# Patient Record
Sex: MALE | Race: WHITE | Employment: FULL TIME | URBAN - METROPOLITAN AREA
[De-identification: names, ages, dates, MRNs, and addresses within clinical notes are randomized per-mention and may not be internally consistent; named-entity substitution may affect disease eponyms.]

---

## 2022-07-27 ENCOUNTER — OFFICE VISIT (OUTPATIENT)
Dept: INTERNAL MEDICINE CLINIC | Facility: CLINIC | Age: 52
End: 2022-07-27
Payer: COMMERCIAL

## 2022-07-27 VITALS
BODY MASS INDEX: 29.52 KG/M2 | DIASTOLIC BLOOD PRESSURE: 64 MMHG | WEIGHT: 206.19 LBS | TEMPERATURE: 98 F | SYSTOLIC BLOOD PRESSURE: 102 MMHG | OXYGEN SATURATION: 98 % | HEART RATE: 75 BPM | HEIGHT: 70 IN

## 2022-07-27 DIAGNOSIS — H91.90 HEARING LOSS, UNSPECIFIED HEARING LOSS TYPE, UNSPECIFIED LATERALITY: ICD-10-CM

## 2022-07-27 DIAGNOSIS — F17.210 CIGARETTE SMOKER: ICD-10-CM

## 2022-07-27 DIAGNOSIS — E66.3 OVERWEIGHT (BMI 25.0-29.9): ICD-10-CM

## 2022-07-27 DIAGNOSIS — Z00.00 HEALTH MAINTENANCE EXAMINATION: Primary | ICD-10-CM

## 2022-07-27 PROCEDURE — 90472 IMMUNIZATION ADMIN EACH ADD: CPT | Performed by: FAMILY MEDICINE

## 2022-07-27 PROCEDURE — 3008F BODY MASS INDEX DOCD: CPT | Performed by: FAMILY MEDICINE

## 2022-07-27 PROCEDURE — 90715 TDAP VACCINE 7 YRS/> IM: CPT | Performed by: FAMILY MEDICINE

## 2022-07-27 PROCEDURE — 90471 IMMUNIZATION ADMIN: CPT | Performed by: FAMILY MEDICINE

## 2022-07-27 PROCEDURE — 3074F SYST BP LT 130 MM HG: CPT | Performed by: FAMILY MEDICINE

## 2022-07-27 PROCEDURE — 96127 BRIEF EMOTIONAL/BEHAV ASSMT: CPT | Performed by: FAMILY MEDICINE

## 2022-07-27 PROCEDURE — 99406 BEHAV CHNG SMOKING 3-10 MIN: CPT | Performed by: FAMILY MEDICINE

## 2022-07-27 PROCEDURE — 90677 PCV20 VACCINE IM: CPT | Performed by: FAMILY MEDICINE

## 2022-07-27 PROCEDURE — 3078F DIAST BP <80 MM HG: CPT | Performed by: FAMILY MEDICINE

## 2022-07-27 PROCEDURE — 99386 PREV VISIT NEW AGE 40-64: CPT | Performed by: FAMILY MEDICINE

## 2022-07-27 NOTE — ASSESSMENT & PLAN NOTE
Patient reports that wife has been noting potential hearing loss  Referral to ENT to further evaluate

## 2022-07-28 DIAGNOSIS — E78.2 MIXED HYPERLIPIDEMIA: Primary | ICD-10-CM

## 2022-07-28 PROBLEM — E78.5 HYPERLIPIDEMIA: Status: ACTIVE | Noted: 2022-07-28

## 2022-07-28 LAB
ABSOLUTE BASOPHILS: 28 CELLS/UL (ref 0–200)
ABSOLUTE EOSINOPHILS: 78 CELLS/UL (ref 15–500)
ABSOLUTE LYMPHOCYTES: 2989 CELLS/UL (ref 850–3900)
ABSOLUTE MONOCYTES: 270 CELLS/UL (ref 200–950)
ABSOLUTE NEUTROPHILS: 3735 CELLS/UL (ref 1500–7800)
ALBUMIN/GLOBULIN RATIO: 2 (CALC) (ref 1–2.5)
ALBUMIN: 4.7 G/DL (ref 3.6–5.1)
ALKALINE PHOSPHATASE: 71 U/L (ref 35–144)
ALT: 20 U/L (ref 9–46)
AST: 17 U/L (ref 10–35)
BASOPHILS: 0.4 %
BILIRUBIN, TOTAL: 0.4 MG/DL (ref 0.2–1.2)
BUN: 13 MG/DL (ref 7–25)
CALCIUM: 10 MG/DL (ref 8.6–10.3)
CARBON DIOXIDE: 31 MMOL/L (ref 20–32)
CHLORIDE: 105 MMOL/L (ref 98–110)
CHOL/HDLC RATIO: 5.7 (CALC)
CHOLESTEROL, TOTAL: 217 MG/DL
CREATININE: 0.85 MG/DL (ref 0.7–1.3)
EGFR: 106 ML/MIN/1.73M2
EOSINOPHILS: 1.1 %
GLOBULIN: 2.4 G/DL (CALC) (ref 1.9–3.7)
GLUCOSE: 70 MG/DL (ref 65–99)
HDL CHOLESTEROL: 38 MG/DL
HEMATOCRIT: 46.3 % (ref 38.5–50)
HEMOGLOBIN A1C: 5 % OF TOTAL HGB
HEMOGLOBIN: 15.3 G/DL (ref 13.2–17.1)
LDL-CHOLESTEROL: 139 MG/DL (CALC)
LYMPHOCYTES: 42.1 %
MCH: 31.6 PG (ref 27–33)
MCHC: 33 G/DL (ref 32–36)
MCV: 95.7 FL (ref 80–100)
MONOCYTES: 3.8 %
MPV: 13.5 FL (ref 7.5–12.5)
NEUTROPHILS: 52.6 %
NON-HDL CHOLESTEROL: 179 MG/DL (CALC)
PLATELET COUNT: 142 THOUSAND/UL (ref 140–400)
POTASSIUM: 4.7 MMOL/L (ref 3.5–5.3)
PROTEIN, TOTAL: 7.1 G/DL (ref 6.1–8.1)
PSA, TOTAL: 0.99 NG/ML
RDW: 12 % (ref 11–15)
RED BLOOD CELL COUNT: 4.84 MILLION/UL (ref 4.2–5.8)
SIGNAL TO CUT-OFF: 0.03
SODIUM: 142 MMOL/L (ref 135–146)
TRIGLYCERIDES: 261 MG/DL
TSH W/REFLEX TO FT4: 2.95 MIU/L (ref 0.4–4.5)
WHITE BLOOD CELL COUNT: 7.1 THOUSAND/UL (ref 3.8–10.8)

## 2022-07-28 NOTE — PROGRESS NOTES
CT calcium score ordered to evaluate cholesterol values and elevated ASCVD risk score. If he prefers to go with statin, he states he will call back. Can consider atorvastatin 20 mg at that time.

## 2024-02-21 NOTE — PROGRESS NOTES
FAMILY MEDICINE CLINIC NOTE    HPI  Imtiaz Isaac is a 53 year old male presenting for physical    #Health Maintenance  -Diet: Good overall. Eats vegetables.    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  -Lung cancer screen: - Smoking history but 19 pack year  -Colon cancer screen: Indicated - provided colonoscopy  -Prostate cancer screen: Indicated- will plan to do next year, recently had blood drawn  -Aortic aneurysm screen: Not indicated  -Statin:  Will check lipid panel  -ASA: Not indicated  -HIV screen: 7/2022 negative  -Hep C screen: 7/2022 negative  -Gonorrhea/chlamydia:  Not indicated  -Syphillis: Not indicated  -TB: Not indicated  -Tobacco/alcohol: Per below  -Depression: PHQ-2 score of 0 (score >/= 3 do PHQ-9)  -Advanced Directive: Indicated     #Immunizations  -Tdap: 7/2022  -Flu shot: Indicated - declines  -PCV13: Not indicated   -PCV20: 7/2022  -PPSV23: Not indicated   -HPV: Not indicated  -RZV (preferred) or VZL: Indicated  -RSV: Not indicated  -COVID: Pfizer     #HLD  -CT calcium score ordered previously, never completed  -reports recently had blood work checked through insurance a few weeks ago - improved from prior    #Thrombocytopenia  -CBC 1/26 - platelet 129     #Hearing loss  -wife reporting hearing loss  -ENT     #Smoking  -still smoking 0.5 ppd   -desiring to quit at this time  -prefers trying varenicline    #Patient Care Team  Patient Care Team:  Tony Clarke MD as PCP - General (Family Medicine)    ROS  GENERAL: No fever/chills, no recent weight loss   HEENT: No visual changes, no changes in hearing, no sore throats  NECK: No pain, no swelling  RESP: No cough, no SOB  CV: No chest pain, no palpitations  GI: No abd pain, no N/V/D  MSK: No edema, no pain  SKIN: No new rashes  NEURO: No numbness, no tingling, no HA    HEALTH MAINTENANCE CHECKLIST  Health Maintenance Topics with due status: Overdue       Topic Date Due    Colorectal Cancer Screening Never done     Tobacco Cessation Counseling 1 Year Never done    Zoster Vaccines Never done    Annual Physical 07/27/2023    COVID-19 Vaccine 09/01/2023    Influenza Vaccine 10/01/2023    Annual Depression Screening 01/01/2024       ALLERGIES  No Known Allergies    MEDICATIONS  Current Outpatient Medications   Medication Sig Dispense Refill    Varenicline Tartrate, Starter, 0.5 MG X 11 & 1 MG X 42 Oral Tablet Therapy Pack Take 1 tablet by mouth in the morning and 1 tablet before bedtime. Days 1 to 3: 0.5 mg once daily. Days 4 to 7: 0.5 mg twice daily. Maintenance (day 8 and later): 1 mg twice daily. 53 each 0    varenicline 1 MG Oral Tab Take 1 tablet (1 mg total) by mouth 2 (two) times daily. 180 tablet 1       ACTIVE PROBLEM  Patient Active Problem List   Diagnosis    Cigarette smoker    Health maintenance examination    Overweight (BMI 25.0-29.9)    Hearing loss    Hyperlipidemia    Thrombocytopenia (HCC)       PAST MEDICAL HISTORY  Past Medical History:   Diagnosis Date    Hyperlipidemia 07/28/2022       PAST SOCIAL HISTORY  Social History     Socioeconomic History    Marital status:      Spouse name: Not on file    Number of children: Not on file    Years of education: Not on file    Highest education level: Not on file   Occupational History    Not on file   Tobacco Use    Smoking status: Every Day     Packs/day: 0.50     Years: 38.00     Additional pack years: 0.00     Total pack years: 19.00     Types: Cigarettes     Start date: 7/27/1986    Smokeless tobacco: Never   Vaping Use    Vaping Use: Never used   Substance and Sexual Activity    Alcohol use: Yes     Comment: Glass of wine every other day    Drug use: Never    Sexual activity: Yes     Partners: Female   Other Topics Concern    Not on file   Social History Narrative    Relationships:  - Masha ( )    Children: 3 kids - Tricia (F) - [place] studying creative writing (graduating), Babar(FELIX) - planning to go to [employer] for [facility] [employer] [facility], Jean Marie  (M) - starting high school    Pets: Dog    School: N/A    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     Origin: Born in [place], grew up in [place], came back to [place].     Interests:  Enjoys spending time with family (kids music, theater, sports), tech related - minecraft    Spiritual: Practicing [employer] in [place]     Social Determinants of Health     Financial Resource Strain: Not on file   Food Insecurity: Not on file   Transportation Needs: Not on file   Physical Activity: Not on file   Stress: Not on file   Social Connections: Not on file   Housing Stability: Not on file       PAST SURGICAL HISTORY  Past Surgical History:   Procedure Laterality Date    APPENDECTOMY      HERNIA REPAIR         PAST FAMILY HISTORY  Family History   Problem Relation Age of Onset    Dementia Mother     No Known Problems Father     No Known Problems Sister     No Known Problems Brother     No Known Problems Maternal Grandmother     No Known Problems Maternal Grandfather     Diabetes Paternal Grandmother     No Known Problems Paternal Grandfather     No Known Problems Brother     No Known Problems Brother     No Known Problems Brother     Colon Cancer Neg     Prostate Cancer Neg        PHYSICAL EXAM  Vitals:    02/22/24 1204   BP: 122/72   Pulse: 62   Temp: 97.2 °F (36.2 °C)   SpO2: 98%   Weight: 209 lb (94.8 kg)   Height: 5' 10\" (1.778 m)      Body mass index is 29.99 kg/m².    GENERAL: NAD  HEENT: Moist mucous membranes, no tonsillar swelling or erythema, PERRLA bilat, TM translucent and non-bulging  NECK: Supple, non-tender  RESP: CTAB, no wheezing, no rales, no ronchi  CV: RRR, no murmurs  GI: Soft, non-distended, non-tender, no guarding, no rebound, no masses  MSK: No edema  SKIN: Warm and dry, no rashes  NEURO: Answering questions appropriately    LABS  Lab Results   Component Value Date    WBC 7.1 07/27/2022    HGB 15.3 07/27/2022    HCT 46.3 07/27/2022     07/27/2022    NEPERCENT 52.6  07/27/2022    LYPERCENT 42.1 07/27/2022    MOPERCENT 3.8 07/27/2022    EOPERCENT 1.1 07/27/2022    BAPERCENT 0.4 07/27/2022    NE 3,735 07/27/2022    LYMABS 2,989 07/27/2022    MOABSO 270 07/27/2022    EOABSO 78 07/27/2022    BAABSO 28 07/27/2022       Lab Results   Component Value Date     07/27/2022    K 4.7 07/27/2022     07/27/2022    CO2 31 07/27/2022    BUN 13 07/27/2022    CREATSERUM 0.85 07/27/2022    BUNCREA NOT APPLICABLE 07/27/2022    GLU 70 07/27/2022    CA 10.0 07/27/2022    ALT 20 07/27/2022    AST 17 07/27/2022    ALKPHO 71 07/27/2022    BILT 0.4 07/27/2022    TP 7.1 07/27/2022    ALB 4.7 07/27/2022    GLOBULIN 2.4 07/27/2022         Lab Results   Component Value Date    CHOLEST 217 (H) 07/27/2022    TRIG 261 (H) 07/27/2022    HDL 38 (L) 07/27/2022     (H) 07/27/2022    TCHDLRATIO 5.7 (H) 07/27/2022    NONHDLC 179 (H) 07/27/2022        DIAGNOSTICS    ASSESSMENT/PLAN  Problem List Items Addressed This Visit          HCC Problems    Thrombocytopenia (HCC)     Seen on recent blood work.  Isolated thrombocytopenia, mild.  Will monitor platelet count with future CBCs.            Cardiac and Vasculature    Hyperlipidemia     History of hyperlipidemia.  Recent cholesterol values from outside lab work are slightly improved.  However given smoking history and elevated ASCVD risk score, recommend considering statin versus CT calcium score.  Check CT calcium score.         Relevant Orders    CT CALCIUM SCORING       Endocrine and Metabolic    Overweight (BMI 25.0-29.9)     Healthy diet and exercise advised.            EarNoseThroat    Hearing loss     Patient reports that wife has been noting potential hearing loss  Referral to ENT to further evaluate         Relevant Orders    ENT - INTERNAL       Tobacco    Cigarette smoker     Smoking cessation advised.  Desiring trial of varenicline at this time - prescribed.   Can call smoking cessation hotline.   Follow-up as needed           Relevant  Medications    Varenicline Tartrate, Starter, 0.5 MG X 11 & 1 MG X 42 Oral Tablet Therapy Pack    varenicline 1 MG Oral Tab       Health Encounters    Health maintenance examination - Primary     Exercise and diet advised.  Outside labs reviewed. Discussed with future blood work, can go through me. Will plan to check A1c and PSA with future blood work as well.   Check shingles vaccine coverage-if covered, can come to clinic for nursing visit vaccines.  Advanced directive information provided.  Advised COVID vaccine booster.  Colonoscopy referral provided.  CT lung screen-if continuing to smoke, will eventually be indicated, however does not currently meet criteria as he has less than 20-pack-year smoking history.         Relevant Orders    [facility] GI Telephone Colon Screen       Return in about 1 year (around 2/22/2025) for physical .    Tony Clarke MD  Family Medicine

## 2024-02-22 ENCOUNTER — OFFICE VISIT (OUTPATIENT)
Dept: INTERNAL MEDICINE CLINIC | Facility: CLINIC | Age: 54
End: 2024-02-22
Payer: COMMERCIAL

## 2024-02-22 VITALS
SYSTOLIC BLOOD PRESSURE: 122 MMHG | BODY MASS INDEX: 29.92 KG/M2 | WEIGHT: 209 LBS | OXYGEN SATURATION: 98 % | TEMPERATURE: 97 F | DIASTOLIC BLOOD PRESSURE: 72 MMHG | HEART RATE: 62 BPM | HEIGHT: 70 IN

## 2024-02-22 DIAGNOSIS — Z00.00 HEALTH MAINTENANCE EXAMINATION: Primary | ICD-10-CM

## 2024-02-22 DIAGNOSIS — E78.5 HYPERLIPIDEMIA, UNSPECIFIED HYPERLIPIDEMIA TYPE: ICD-10-CM

## 2024-02-22 DIAGNOSIS — F17.210 CIGARETTE SMOKER: ICD-10-CM

## 2024-02-22 DIAGNOSIS — E66.3 OVERWEIGHT (BMI 25.0-29.9): ICD-10-CM

## 2024-02-22 DIAGNOSIS — D69.6 THROMBOCYTOPENIA (HCC): ICD-10-CM

## 2024-02-22 DIAGNOSIS — H91.90 HEARING LOSS, UNSPECIFIED HEARING LOSS TYPE, UNSPECIFIED LATERALITY: ICD-10-CM

## 2024-02-22 PROCEDURE — 99396 PREV VISIT EST AGE 40-64: CPT | Performed by: FAMILY MEDICINE

## 2024-02-22 RX ORDER — VARENICLINE TARTRATE 0.5 (11)-1
1 KIT ORAL 2 TIMES DAILY
Qty: 53 EACH | Refills: 0 | Status: SHIPPED | OUTPATIENT
Start: 2024-02-22

## 2024-02-22 RX ORDER — VARENICLINE TARTRATE 1 MG/1
1 TABLET, FILM COATED ORAL 2 TIMES DAILY
Qty: 180 TABLET | Refills: 1 | Status: SHIPPED | OUTPATIENT
Start: 2024-02-22

## 2024-02-22 NOTE — PATIENT INSTRUCTIONS
PATIENT INSTRUCTIONS    Thank you for seeing me today, it was a pleasure taking care of you.  Please check out at the  and schedule a follow up appointment.  Return in about 1 year (around 2/22/2025) for physical .  Please remember that the bernarda period for all appointments is 5 minutes. This is to help maximize the amount of time that we can spend together at our visits.    The following imaging studies were ordered: CT calcium score - cholesterol  Please call [phone] to schedule your imaging appointment.   Please also follow up with the following specialists: GI - colonoscopy, ENT - hearing   Please call [phone] to talk to an individual who will help you schedule your colonoscopy.  Please fill out the advance directive information (power of  documents) and bring a copy to our clinic.  Varenicline - Days 1 to 3: 0.5 mg once daily. Days 4 to 7: 0.5 mg twice daily. Maintenance (day 8 and later): 1 mg twice daily  Can also consider smoking cessation hotline   Check with your insurance regarding the Shingrex shingles vaccine - if can receive, consider pharmacy or coming to my office   Flu shot and COVID vaccines     Dr. Vince Del Castillo

## 2024-02-22 NOTE — ASSESSMENT & PLAN NOTE
Smoking cessation advised.  Desiring trial of varenicline at this time - prescribed.   Can call smoking cessation hotline.   Follow-up as needed

## 2024-02-22 NOTE — ASSESSMENT & PLAN NOTE
Seen on recent blood work.  Isolated thrombocytopenia, mild.  Will monitor platelet count with future CBCs.

## 2024-02-22 NOTE — ASSESSMENT & PLAN NOTE
Exercise and diet advised.  Outside labs reviewed. Discussed with future blood work, can go through me. Will plan to check A1c and PSA with future blood work as well.   Check shingles vaccine coverage-if covered, can come to clinic for nursing visit vaccines.  Advanced directive information provided.  Advised COVID vaccine booster.  Colonoscopy referral provided.  CT lung screen-if continuing to smoke, will eventually be indicated, however does not currently meet criteria as he has less than 20-pack-year smoking history.

## 2024-03-05 ENCOUNTER — TELEPHONE (OUTPATIENT)
Dept: INTERNAL MEDICINE CLINIC | Facility: CLINIC | Age: 54
End: 2024-03-05

## 2024-03-05 NOTE — TELEPHONE ENCOUNTER
Confirmed with pharm, chantix needs PA.     PA submitted on cover my meds for chantix starter pack. Key: [id number]. Awaiting response.

## 2024-03-06 NOTE — TELEPHONE ENCOUNTER
Contacted patient. Discussed that varenicline was not covered. Switched to bupropion 150 mg - daily for 3 days, then twice daily thereafter. Can attempt to stop smoking 1 week in.